# Patient Record
Sex: MALE
[De-identification: names, ages, dates, MRNs, and addresses within clinical notes are randomized per-mention and may not be internally consistent; named-entity substitution may affect disease eponyms.]

---

## 2020-09-24 ENCOUNTER — HOSPITAL ENCOUNTER (OUTPATIENT)
Dept: HOSPITAL 56 - MW.SDS | Age: 70
Discharge: HOME | End: 2020-09-24
Attending: SURGERY
Payer: MEDICARE

## 2020-09-24 DIAGNOSIS — G89.29: ICD-10-CM

## 2020-09-24 DIAGNOSIS — K40.30: Primary | ICD-10-CM

## 2020-09-24 DIAGNOSIS — F17.210: ICD-10-CM

## 2020-09-24 DIAGNOSIS — Z79.891: ICD-10-CM

## 2020-09-24 PROCEDURE — 49507 PRP I/HERN INIT BLOCK >5 YR: CPT

## 2020-09-24 NOTE — PCM.PREANE
Preanesthetic Assessment





- Anesthesia/Transfusion/Family Hx


Anesthesia History: Prior Anesthesia Without Reaction


Family History of Anesthesia Reaction: No


Transfusion History: No Prior Transfusion(s)





- Review of Systems


General: No Symptoms


Pulmonary: No Symptoms


Cardiovascular: No Symptoms


Gastrointestinal: No Symptoms


Neurological: No Symptoms


Other: Reports: None





- Physical Assessment


NPO Status Date: 09/23/20


Vital Signs: 





                                Last Vital Signs











Temp  97.7 F   09/24/20 07:00


 


Pulse  53 L  09/24/20 07:00


 


Resp  16   09/24/20 07:00


 


BP  129/59 L  09/24/20 07:00


 


Pulse Ox  96   09/24/20 07:00











Height: 5 ft 7 in


Weight: 74.389 kg


ASA Class: 2


Mental Status: Alert & Oriented x3


Airway Class: Mallampati = 2


Dentition: Reports: Edentulous


ROM/Head Extension: Full


Lungs: Clear to Auscultation, Normal Respiratory Effort


Cardiovascular: Regular Rate, Regular Rhythm





- Allergies


Allergies/Adverse Reactions: 


                                    Allergies











Allergy/AdvReac Type Severity Reaction Status Date / Time


 


No Known Allergies Allergy   Verified 09/18/20 11:28














- Blood


Blood Available: No





- Acknowledgements


Anesthesia Type Planned: General Anesthesia (lma unless surgeon requests muscle 

paralysis)


Pt an Appropriate Candidate for the Planned Anesthesia: Yes


Alternatives and Risks of Anesthesia Discussed w Pt/Guardian: Yes


Pt/Guardian Understands and Agrees with Anesthesia Plan: Yes


Additional Comments: 





pmh: smoker, chronic LBP with chronic opioid use (will have tolerance and 

hyperalgesia)


PLAN: ga/lma unless surgeon requests paralysis








PreAnesthesia Questionnaire


HEENT History: Reports: Other (See Below)


Other HEENT History: uses reading glasses,  has upper and lower dentures


Musculoskeletal History: Reports: Back Pain, Chronic, Osteoarthritis





- Past Surgical History


Head Surgeries/Procedures: Reports: None


Neurological Surgical History: Reports: Lumbar Spine, Spinal Fusion


Other Neurological Surgeries/Procedures: has 2 rods and screws in lower back





- SUBSTANCE USE


Smoking Status *Q: Current Every Day Smoker


Tobacco Use Within Last Twelve Months: Cigarettes


Recreational Drug Use History: No





- HOME MEDS


Home Medications: 


                                    Home Meds





Multivitamin [Multivitamins] 1 cap PO DAILY 09/18/20 [History]


oxyCODONE 5 - 15 mg PO Q3H PRN 09/18/20 [History]


oxyCODONE HCl [Oxycodone HCl ER] 20 mg PO BID 09/18/20 [History]











- CURRENT (IN HOUSE) MEDS


Current Meds: 





                               Current Medications





Acetaminophen 1,000 mg/ Premix  100 mls @ 400 mls/hr IV NOW ONE


   Stop: 09/24/20 10:35


Lactated Ringer's (Ringers, Lactated)  1,000 mls @ 125 mls/hr IV ASDIRECTED DAFNE


   Last Admin: 09/24/20 07:09 Dose:  125 mls/hr


   Documented by: 


Pregabalin (Lyrica)  150 mg PO DAILY ONE


   Stop: 09/24/20 09:01





Discontinued Medications





Fentanyl (Sublimaze) Confirm Administered Dose 100 mcg .ROUTE .STK-MED ONE


   Stop: 09/24/20 06:59


Glycopyrrolate (Robinul) Confirm Administered Dose 0.2 mg .ROUTE .STK-MED ONE


   Stop: 09/24/20 07:02


Cefazolin Sodium/Dextrose 2 gm (/ Premix)  50 mls @ 100 mls/hr IV ONETIME ONE


   Stop: 09/23/20 10:44


Ketorolac Tromethamine (Toradol) Confirm Administered Dose 30 mg .ROUTE .STK-MED

 ONE


   Stop: 09/24/20 07:02


Lidocaine (Xylocaine-Mpf 2%) Confirm Administered Dose 5 ml .ROUTE .STK-MED ONE


   Stop: 09/24/20 07:02


Midazolam HCl (Versed 1 Mg/Ml) Confirm Administered Dose 2 mg .ROUTE .STK-MED 

ONE


   Stop: 09/24/20 06:59


Midazolam HCl (Versed 1 Mg/Ml) Confirm Administered Dose 2 mg .ROUTE .STK-MED 

ONE


   Stop: 09/24/20 07:05


Ondansetron HCl (Zofran) Confirm Administered Dose 4 mg .ROUTE .STK-MED ONE


   Stop: 09/24/20 07:02


Propofol (Diprivan  20 Ml) Confirm Administered Dose 200 mg .ROUTE .STK-MED ONE


   Stop: 09/24/20 06:59


Propofol (Diprivan  20 Ml) Confirm Administered Dose 600 mg .ROUTE .STK-MED ONE


   Stop: 09/24/20 07:05

## 2020-09-24 NOTE — PCM.OPNOTE
- General Post-Op/Procedure Note


Date of Surgery/Procedure: 09/24/20


Operative Procedure(s): Open left inguinal hernia repair


Findings: 





left inguinal hernia with omentum.  dictation number [id number] 


Pre Op Diagnosis: incarcerated Left inguinal hernia.


Post-Op Diagnosis: incarcerated left inguinal hernia


Anesthesia Technique: General LMA


Primary Surgeon: Evan Aguilar


Pathology: 





Hernia sac and omentum


EBL in mLs: 10


Complications: None


Condition: Good

## 2020-09-24 NOTE — OR
SURGEON:

ALINA MARAVILLA MD

 

DATE OF PROCEDURE:  09/24/2020

 

PREOPERATIVE DIAGNOSIS:

Incarcerated left inguinal hernia.

 

POSTOPERATIVE DIAGNOSIS:

Incarcerated left inguinal hernia.

 

PROCEDURE PERFORMED:

Open left inguinal hernia repair with mesh.

 

PRIMARY SURGEON:

Alina Maravilla MD

 

ANESTHESIA:

General.

 

FINDINGS:

The patient did have an indirect inguinal hernia with a large amount of omentum 
in the hernia sac.

 

SPECIMENS:

Hernia sac and part of omentum.

 

ESTIMATED BLOOD LOSS:

10 mL.

 

REASON FOR PROCEDURE:

The patient is a pleasant 70-year-old gentleman who says he has had a left

inguinal hernia since he was at least a teenager, maybe longer.  It has not

really bothered him.  It has always been large.  However, he says over the last

2 or 3 months, he has noticed another bulge coming up above it.  He is always

able to reduce that one.  It does cause him discomfort.  The patient would like

to have his left inguinal hernia repaired.  I did go over the risks, goals, and

alternatives of the hernia repair which include, but not limited to, bleeding,

infection, mesh infection, recurrence, hematoma, seroma, injury to spermatic

cord and possible testicle damage, nerve entrapment, chronic pain, scar

formation.  The patient understands, and he wishes to proceed.

 

PROCEDURE NARRATIVE:

The patient was brought back to the OR.  He was prepped and draped in usual

fashion.  SCDs placed.  Preoperative antibiotics were given.  Anesthesia

provided by the Anesthesia team.

 

After a time-out was performed an oblique incision was made over the inguinal 
canal, using the pubic tubercle as

a guide for the medial end of the incision.  This was carried down through 
Kevin's and the external oblique, which

was carefully opened with the ilioinguinal nerve preserved.  The hernia sac and 
spermatic cord were then encircled at

the level of pubic tubercle with blunt dissection.  Now, the hernia sac was 
carefully brought up.  It

was still adhesed down in the scrotum, but with some gentle dissection was

brought up.  The hernia sac was completely  from cord structures ashley to
the level of the internal inguinal ring. 

 

Again, I was unable to reduce the hernia sac contents into the abdomen.  Because

of this, the hernia sac was opened.  The contents were examined, did appear to

be omentum.  Because it had been down

in the scrotum so long, the distal end of the omentum had firmed into a more 
firm ball.  The hernia defect actually was

fairly small.  Because of this, I used ligature to remove about half of the 
omentum,

There was good hemostasis.  Now, the rest of the omentum did

easily go into the abdominal cavity.  Now, the base of the hernia sac was then

sutured with a silk for high ligation and transected.  Both the omentum and

hernia sac were sent to pathology.  Again, the area was inspected.  There was 
good hemostasis.

A Bard mesh PerFix plug-and-patch system was placed.  Plug was not used.  The 
mesh

did lie nicely over the defect.  It was sutured in place, medially at the pubic 
tubercle, lateral to into the muscle beyond the external ring, inferiorly to the
selfing edge of the inguinal ligament, and superiorly to the conjoint tendon.  
It lay nice and flat.  There was good coverage.  The internal ring in the mesh

was checked and it did not appear to be too tight.  



The external oblique fascia was then closed with 2-0 Vicryl making surge the 
external ring was not too tight.  The remaining of the local was injected into

the fascia and to do an ilioinguinal nerve block.  Now, Kevin fascia was

closed with interrupted 3-0 Vicryl and the skin was closed with 4-0 Monocryl.

 

At the end of the case, sponge and needle counts were correct and both testicles

were at scrotum.  The patient was transferred to recovery room in stable

condition.

 

 

MAY CHRISTIANSON

DD:  09/24/2020 11:30:51

DT:  09/24/2020 13:37:11

Job #:  [id number]

JONNIE

## 2020-09-24 NOTE — PCM.POSTAN
POST ANESTHESIA ASSESSMENT





- MENTAL STATUS


Mental Status: Alert, Oriented





- VITAL SIGNS


Vital Signs: 


                                Last Vital Signs











Temp  98.2 F   09/24/20 10:10


 


Pulse  62   09/24/20 10:31


 


Resp  12   09/24/20 10:31


 


BP  119/47 L  09/24/20 10:31


 


Pulse Ox  96   09/24/20 10:31














- RESPIRATORY


Respiratory Status: Respiratory Rate WNL, Airway Patent, O2 Saturation Stable





- CARDIOVASCULAR


CV Status: Pulse Rate WNL, Blood Pressure Stable





- GASTROINTESTINAL


GI Status: No Symptoms





- POST OP HYDRATION


Hydration Status: Adequate & Stable

## 2020-09-24 NOTE — [id number]
Post Anesthesia Note





- EVALUATION WITHIN 48HRS OF ANESTHETIC


Vital Signs in Normal Range: Yes


Patient Participated in Evaluation: Yes


Respiratory Function Stable: Yes


Airway Patent: Yes


Cardiovascular Function Stable: Yes


Hydration Status Stable: Yes


Pain Control Satisfactory: Yes


Nausea and Vomiting Control Satisfactory: Yes


Mental Status Recovered: Yes


Vital Signs: 


                                Last Vital Signs











Temp  97.9 F   09/24/20 10:40


 


Pulse  54 L  09/24/20 11:30


 


Resp  14   09/24/20 11:30


 


BP  97/54 L  09/24/20 11:30


 


Pulse Ox  94 L  09/24/20 11:30